# Patient Record
Sex: FEMALE | Race: WHITE | Employment: FULL TIME
[De-identification: names, ages, dates, MRNs, and addresses within clinical notes are randomized per-mention and may not be internally consistent; named-entity substitution may affect disease eponyms.]

---

## 2022-11-16 ENCOUNTER — OFFICE VISIT (OUTPATIENT)
Dept: INTERNAL MEDICINE CLINIC | Facility: CLINIC | Age: 62
End: 2022-11-16

## 2022-11-16 VITALS
SYSTOLIC BLOOD PRESSURE: 186 MMHG | TEMPERATURE: 98.7 F | OXYGEN SATURATION: 97 % | DIASTOLIC BLOOD PRESSURE: 121 MMHG | HEART RATE: 91 BPM | WEIGHT: 192.5 LBS | HEIGHT: 64 IN | BODY MASS INDEX: 32.87 KG/M2

## 2022-11-16 DIAGNOSIS — I10 ESSENTIAL HYPERTENSION: ICD-10-CM

## 2022-11-16 DIAGNOSIS — F41.9 ANXIETY: Primary | ICD-10-CM

## 2022-11-16 LAB
ALBUMIN SERPL-MCNC: 3.9 G/DL (ref 3.2–4.6)
ALBUMIN/GLOB SERPL: 1.1 {RATIO} (ref 0.4–1.6)
ALP SERPL-CCNC: 90 U/L (ref 50–136)
ALT SERPL-CCNC: 21 U/L (ref 12–65)
ANION GAP SERPL CALC-SCNC: 7 MMOL/L (ref 2–11)
AST SERPL-CCNC: 13 U/L (ref 15–37)
BASOPHILS # BLD: 0.1 K/UL (ref 0–0.2)
BASOPHILS NFR BLD: 1 % (ref 0–2)
BILIRUB SERPL-MCNC: 0.5 MG/DL (ref 0.2–1.1)
BUN SERPL-MCNC: 12 MG/DL (ref 8–23)
CALCIUM SERPL-MCNC: 8.7 MG/DL (ref 8.3–10.4)
CHLORIDE SERPL-SCNC: 107 MMOL/L (ref 101–110)
CHOLEST SERPL-MCNC: 209 MG/DL
CO2 SERPL-SCNC: 25 MMOL/L (ref 21–32)
CREAT SERPL-MCNC: 0.7 MG/DL (ref 0.6–1)
CREAT UR-MCNC: 82 MG/DL
DIFFERENTIAL METHOD BLD: NORMAL
EOSINOPHIL # BLD: 0.1 K/UL (ref 0–0.8)
EOSINOPHIL NFR BLD: 2 % (ref 0.5–7.8)
ERYTHROCYTE [DISTWIDTH] IN BLOOD BY AUTOMATED COUNT: 12.8 % (ref 11.9–14.6)
EST. AVERAGE GLUCOSE BLD GHB EST-MCNC: 103 MG/DL
GLOBULIN SER CALC-MCNC: 3.7 G/DL (ref 2.8–4.5)
GLUCOSE SERPL-MCNC: 95 MG/DL (ref 65–100)
HBA1C MFR BLD: 5.2 % (ref 4.8–5.6)
HCT VFR BLD AUTO: 40.3 % (ref 35.8–46.3)
HDLC SERPL-MCNC: 52 MG/DL (ref 40–60)
HDLC SERPL: 4 {RATIO}
HGB BLD-MCNC: 13.2 G/DL (ref 11.7–15.4)
IMM GRANULOCYTES # BLD AUTO: 0 K/UL (ref 0–0.5)
IMM GRANULOCYTES NFR BLD AUTO: 0 % (ref 0–5)
LDLC SERPL CALC-MCNC: 127 MG/DL
LYMPHOCYTES # BLD: 1.3 K/UL (ref 0.5–4.6)
LYMPHOCYTES NFR BLD: 27 % (ref 13–44)
MCH RBC QN AUTO: 28.7 PG (ref 26.1–32.9)
MCHC RBC AUTO-ENTMCNC: 32.8 G/DL (ref 31.4–35)
MCV RBC AUTO: 87.6 FL (ref 82–102)
MICROALBUMIN UR-MCNC: 0.82 MG/DL (ref 0–3)
MICROALBUMIN/CREAT UR-RTO: 10 MG/G (ref 0–30)
MONOCYTES # BLD: 0.5 K/UL (ref 0.1–1.3)
MONOCYTES NFR BLD: 11 % (ref 4–12)
NEUTS SEG # BLD: 2.9 K/UL (ref 1.7–8.2)
NEUTS SEG NFR BLD: 59 % (ref 43–78)
NRBC # BLD: 0 K/UL (ref 0–0.2)
PLATELET # BLD AUTO: 191 K/UL (ref 150–450)
PMV BLD AUTO: 10.5 FL (ref 9.4–12.3)
POTASSIUM SERPL-SCNC: 3.9 MMOL/L (ref 3.5–5.1)
PROT SERPL-MCNC: 7.6 G/DL (ref 6.3–8.2)
RBC # BLD AUTO: 4.6 M/UL (ref 4.05–5.2)
SODIUM SERPL-SCNC: 139 MMOL/L (ref 133–143)
TRIGL SERPL-MCNC: 150 MG/DL (ref 35–150)
VLDLC SERPL CALC-MCNC: 30 MG/DL (ref 6–23)
WBC # BLD AUTO: 4.9 K/UL (ref 4.3–11.1)

## 2022-11-16 PROCEDURE — 99204 OFFICE O/P NEW MOD 45 MIN: CPT | Performed by: STUDENT IN AN ORGANIZED HEALTH CARE EDUCATION/TRAINING PROGRAM

## 2022-11-16 PROCEDURE — 3077F SYST BP >= 140 MM HG: CPT | Performed by: STUDENT IN AN ORGANIZED HEALTH CARE EDUCATION/TRAINING PROGRAM

## 2022-11-16 PROCEDURE — 3080F DIAST BP >= 90 MM HG: CPT | Performed by: STUDENT IN AN ORGANIZED HEALTH CARE EDUCATION/TRAINING PROGRAM

## 2022-11-16 RX ORDER — LISINOPRIL 40 MG/1
40 TABLET ORAL DAILY
COMMUNITY
Start: 2021-09-09

## 2022-11-16 RX ORDER — DICLOFENAC SODIUM 75 MG/1
75 TABLET, DELAYED RELEASE ORAL PRN
COMMUNITY
Start: 2015-08-17

## 2022-11-16 RX ORDER — PSEUDOEPHEDRINE HCL 30 MG
200 TABLET ORAL 2 TIMES DAILY
COMMUNITY
Start: 2015-02-04

## 2022-11-16 RX ORDER — OMEPRAZOLE 20 MG/1
20 CAPSULE, DELAYED RELEASE ORAL NIGHTLY
COMMUNITY
Start: 2021-10-11

## 2022-11-16 RX ORDER — PHENOL 1.4 %
10 AEROSOL, SPRAY (ML) MUCOUS MEMBRANE
COMMUNITY

## 2022-11-16 RX ORDER — MAGNESIUM 30 MG
30 TABLET ORAL
COMMUNITY

## 2022-11-16 RX ORDER — FLUOXETINE HYDROCHLORIDE 40 MG/1
40 CAPSULE ORAL DAILY
COMMUNITY
Start: 2021-09-09 | End: 2022-11-16 | Stop reason: SDUPTHER

## 2022-11-16 RX ORDER — LIDOCAINE 50 MG/G
1 PATCH TOPICAL DAILY
COMMUNITY
Start: 2021-10-22

## 2022-11-16 RX ORDER — LISINOPRIL AND HYDROCHLOROTHIAZIDE 12.5; 1 MG/1; MG/1
1 TABLET ORAL DAILY
Qty: 30 TABLET | Refills: 2 | Status: SHIPPED | OUTPATIENT
Start: 2022-11-16

## 2022-11-16 RX ORDER — ACETAMINOPHEN 325 MG/1
650 TABLET ORAL
COMMUNITY
Start: 2021-10-22

## 2022-11-16 RX ORDER — FLUOXETINE HYDROCHLORIDE 20 MG/1
60 CAPSULE ORAL DAILY
Qty: 90 CAPSULE | Refills: 2 | Status: SHIPPED | OUTPATIENT
Start: 2022-11-16 | End: 2023-02-14

## 2022-11-16 RX ORDER — VITAMIN B COMPLEX
1 TABLET ORAL DAILY
COMMUNITY

## 2022-11-16 RX ORDER — HYDROCORTISONE 25 MG/G
CREAM TOPICAL DAILY PRN
COMMUNITY

## 2022-11-16 SDOH — ECONOMIC STABILITY: FOOD INSECURITY: WITHIN THE PAST 12 MONTHS, YOU WORRIED THAT YOUR FOOD WOULD RUN OUT BEFORE YOU GOT MONEY TO BUY MORE.: SOMETIMES TRUE

## 2022-11-16 SDOH — ECONOMIC STABILITY: FOOD INSECURITY: WITHIN THE PAST 12 MONTHS, THE FOOD YOU BOUGHT JUST DIDN'T LAST AND YOU DIDN'T HAVE MONEY TO GET MORE.: SOMETIMES TRUE

## 2022-11-16 ASSESSMENT — PATIENT HEALTH QUESTIONNAIRE - PHQ9
SUM OF ALL RESPONSES TO PHQ9 QUESTIONS 1 & 2: 4
SUM OF ALL RESPONSES TO PHQ QUESTIONS 1-9: 9
4. FEELING TIRED OR HAVING LITTLE ENERGY: 1
10. IF YOU CHECKED OFF ANY PROBLEMS, HOW DIFFICULT HAVE THESE PROBLEMS MADE IT FOR YOU TO DO YOUR WORK, TAKE CARE OF THINGS AT HOME, OR GET ALONG WITH OTHER PEOPLE: 2
3. TROUBLE FALLING OR STAYING ASLEEP: 2
1. LITTLE INTEREST OR PLEASURE IN DOING THINGS: 2
5. POOR APPETITE OR OVEREATING: 0
8. MOVING OR SPEAKING SO SLOWLY THAT OTHER PEOPLE COULD HAVE NOTICED. OR THE OPPOSITE, BEING SO FIGETY OR RESTLESS THAT YOU HAVE BEEN MOVING AROUND A LOT MORE THAN USUAL: 1
7. TROUBLE CONCENTRATING ON THINGS, SUCH AS READING THE NEWSPAPER OR WATCHING TELEVISION: 1
SUM OF ALL RESPONSES TO PHQ QUESTIONS 1-9: 9
2. FEELING DOWN, DEPRESSED OR HOPELESS: 2
SUM OF ALL RESPONSES TO PHQ QUESTIONS 1-9: 9
9. THOUGHTS THAT YOU WOULD BE BETTER OFF DEAD, OR OF HURTING YOURSELF: 0
SUM OF ALL RESPONSES TO PHQ QUESTIONS 1-9: 9
6. FEELING BAD ABOUT YOURSELF - OR THAT YOU ARE A FAILURE OR HAVE LET YOURSELF OR YOUR FAMILY DOWN: 0

## 2022-11-16 ASSESSMENT — ENCOUNTER SYMPTOMS
VOMITING: 0
ABDOMINAL PAIN: 0
NAUSEA: 0
SHORTNESS OF BREATH: 0

## 2022-11-16 ASSESSMENT — SOCIAL DETERMINANTS OF HEALTH (SDOH): HOW HARD IS IT FOR YOU TO PAY FOR THE VERY BASICS LIKE FOOD, HOUSING, MEDICAL CARE, AND HEATING?: SOMEWHAT HARD
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
## 2022-12-07 ENCOUNTER — OFFICE VISIT (OUTPATIENT)
Dept: INTERNAL MEDICINE CLINIC | Facility: CLINIC | Age: 62
End: 2022-12-07
Payer: COMMERCIAL

## 2022-12-07 VITALS
HEIGHT: 64 IN | OXYGEN SATURATION: 98 % | WEIGHT: 188 LBS | HEART RATE: 106 BPM | DIASTOLIC BLOOD PRESSURE: 78 MMHG | SYSTOLIC BLOOD PRESSURE: 118 MMHG | BODY MASS INDEX: 32.1 KG/M2

## 2022-12-07 DIAGNOSIS — K21.9 GASTROESOPHAGEAL REFLUX DISEASE, UNSPECIFIED WHETHER ESOPHAGITIS PRESENT: ICD-10-CM

## 2022-12-07 DIAGNOSIS — B37.9 YEAST INFECTION: ICD-10-CM

## 2022-12-07 DIAGNOSIS — E78.00 PURE HYPERCHOLESTEROLEMIA: ICD-10-CM

## 2022-12-07 DIAGNOSIS — I10 ESSENTIAL HYPERTENSION: Primary | ICD-10-CM

## 2022-12-07 DIAGNOSIS — F41.9 ANXIETY: ICD-10-CM

## 2022-12-07 DIAGNOSIS — Z12.31 ENCOUNTER FOR SCREENING MAMMOGRAM FOR MALIGNANT NEOPLASM OF BREAST: ICD-10-CM

## 2022-12-07 PROBLEM — S82.841A CLOSED BIMALLEOLAR FRACTURE OF RIGHT ANKLE: Status: ACTIVE | Noted: 2019-01-15

## 2022-12-07 PROBLEM — S32.000A COMPRESSION FRACTURE OF LUMBAR VERTEBRA (HCC): Status: ACTIVE | Noted: 2021-12-20

## 2022-12-07 PROCEDURE — 3078F DIAST BP <80 MM HG: CPT | Performed by: STUDENT IN AN ORGANIZED HEALTH CARE EDUCATION/TRAINING PROGRAM

## 2022-12-07 PROCEDURE — 99214 OFFICE O/P EST MOD 30 MIN: CPT | Performed by: STUDENT IN AN ORGANIZED HEALTH CARE EDUCATION/TRAINING PROGRAM

## 2022-12-07 PROCEDURE — 3074F SYST BP LT 130 MM HG: CPT | Performed by: STUDENT IN AN ORGANIZED HEALTH CARE EDUCATION/TRAINING PROGRAM

## 2022-12-07 RX ORDER — LISINOPRIL AND HYDROCHLOROTHIAZIDE 12.5; 1 MG/1; MG/1
1 TABLET ORAL DAILY
Qty: 90 TABLET | Refills: 1 | Status: SHIPPED | OUTPATIENT
Start: 2022-12-07

## 2022-12-07 RX ORDER — FLUOXETINE HYDROCHLORIDE 20 MG/1
60 CAPSULE ORAL DAILY
Qty: 270 CAPSULE | Refills: 1 | Status: SHIPPED | OUTPATIENT
Start: 2022-12-07 | End: 2023-03-07

## 2022-12-07 RX ORDER — OMEPRAZOLE 20 MG/1
20 CAPSULE, DELAYED RELEASE ORAL NIGHTLY
Qty: 90 CAPSULE | Refills: 1 | Status: SHIPPED | OUTPATIENT
Start: 2022-12-07

## 2022-12-07 ASSESSMENT — PATIENT HEALTH QUESTIONNAIRE - PHQ9
SUM OF ALL RESPONSES TO PHQ QUESTIONS 1-9: 0
2. FEELING DOWN, DEPRESSED OR HOPELESS: 0
1. LITTLE INTEREST OR PLEASURE IN DOING THINGS: 0
SUM OF ALL RESPONSES TO PHQ9 QUESTIONS 1 & 2: 0

## 2022-12-07 ASSESSMENT — ENCOUNTER SYMPTOMS
NAUSEA: 0
ABDOMINAL PAIN: 0
VOMITING: 0
SHORTNESS OF BREATH: 0

## 2022-12-07 NOTE — PROGRESS NOTES
FOLLOW UP VISIT    Subjective:    Ingrid Grant (: 1960) is a 65 y.o., female,   Chief Complaint   Patient presents with    Hypertension     3 week f/u       HPI:    Uses stool softener at night, having explosive diarrhea, some fecal incontinence. IBS: Takes colace at night, 200 mg at night for years. Has both constipation and diarrhea. Imodium doesn't really work well. Anxiety has been much better. Recent promotion, her SO is coming next week. Mother had breast cancer age 76 and again age [de-identified]. Father  age 47 lung cancer, smoked    HTN: BP has been slowly decreasing, 120-130s up to 150s, bottom number 70-90s. Healthcare maintenance: Last colonoscopy 2-3 years ago, due in about 2 years. Last mammogram 1.5 years ago. Last pap about 2 years ago. Had flu shot, COVID x2 and a booster (updated one), due for shingles    The following portions of the patient's history were reviewed and updated as appropriate:      Patient Active Problem List   Diagnosis    Anxiety    Essential hypertension     Past Surgical History:   Procedure Laterality Date    ANKLE FRACTURE SURGERY Right     twice     History reviewed. No pertinent family history.   Social History     Socioeconomic History    Marital status: Single     Spouse name: Not on file    Number of children: Not on file    Years of education: Not on file    Highest education level: Not on file   Occupational History    Not on file   Tobacco Use    Smoking status: Never    Smokeless tobacco: Never   Vaping Use    Vaping Use: Never used   Substance and Sexual Activity    Alcohol use: Not Currently    Drug use: Never    Sexual activity: Not on file   Other Topics Concern    Not on file   Social History Narrative    Not on file     Social Determinants of Health     Financial Resource Strain: Medium Risk    Difficulty of Paying Living Expenses: Somewhat hard   Food Insecurity: Food Insecurity Present    Worried About [address] in the Last Year: Sometimes true    Ran Out of Food in the Last Year: Sometimes true   Transportation Needs: Not on file   Physical Activity: Not on file   Stress: Not on file   Social Connections: Not on file   Intimate Partner Violence: Not on file   Housing Stability: Not on file     Current Outpatient Medications   Medication Sig Dispense Refill    diphenhydrAMINE-APAP, sleep, (TYLENOL PM EXTRA STRENGTH PO) Take 2 tablets by mouth nightly      lisinopril-hydroCHLOROthiazide (PRINZIDE;ZESTORETIC) 10-12.5 MG per tablet Take 1 tablet by mouth daily 90 tablet 1    FLUoxetine (PROZAC) 20 MG capsule Take 3 capsules by mouth daily 270 capsule 1    omeprazole (PRILOSEC) 20 MG delayed release capsule Take 1 capsule by mouth nightly 90 capsule 1    miconazole nitrate (LOTRIMIN AF) 2 % AERP Apply to affected area two times a day for 28 days 85 g 0    Vitamin D (CHOLECALCIFEROL) 25 MCG (1000 UT) TABS tablet Take 1 tablet by mouth daily      diclofenac (VOLTAREN) 75 MG EC tablet Take 75 mg by mouth as needed      hydrocortisone (ANUSOL-HC) 2.5 % CREA rectal cream Place rectally daily as needed      magnesium 30 MG tablet Take 30 mg by mouth daily      Melatonin 10 MG TABS Take 10 mg by mouth nightly      docusate (COLACE, DULCOLAX) 100 MG CAPS Take 200 mg by mouth 2 times daily       No current facility-administered medications for this visit. Allergies as of 12/07/2022 - Fully Reviewed 12/07/2022   Allergen Reaction Noted    Codeine Nausea And Vomiting 02/02/2015    Hydromorphone Nausea And Vomiting 10/17/2021    Cefuroxime axetil Nausea Only 02/26/2013    Sulfa antibiotics Other (See Comments) 02/02/2015    Oxycodone-acetaminophen Nausea And Vomiting 10/14/2015    Penicillins Nausea And Vomiting 02/26/2013    Tomato Nausea And Vomiting 08/31/2017       Review of Systems   Constitutional:  Negative for chills and fever. HENT:  Negative for congestion. Eyes:  Negative for visual disturbance.    Respiratory:  Negative for shortness of breath. Cardiovascular:  Negative for chest pain. Gastrointestinal:  Negative for abdominal pain, nausea and vomiting. Musculoskeletal:  Negative for arthralgias. Neurological:  Negative for syncope and headaches. Objective:    Vitals:    12/07/22 1434   BP: 118/78   Pulse: (!) 106   SpO2: 98%   Weight: 188 lb (85.3 kg)   Height: 5' 4\" (1.626 m)        Physical Exam  Constitutional:       General: She is not in acute distress. HENT:      Head: Normocephalic and atraumatic. Right Ear: Tympanic membrane, ear canal and external ear normal.      Left Ear: Tympanic membrane, ear canal and external ear normal.      Nose: Nose normal.      Mouth/Throat:      Mouth: Mucous membranes are moist.      Pharynx: Oropharynx is clear. No oropharyngeal exudate. Eyes:      Extraocular Movements: Extraocular movements intact. Conjunctiva/sclera: Conjunctivae normal.      Pupils: Pupils are equal, round, and reactive to light. Neck:      Vascular: No carotid bruit. Cardiovascular:      Rate and Rhythm: Normal rate and regular rhythm. Pulses: Normal pulses. Heart sounds: Normal heart sounds. Pulmonary:      Effort: Pulmonary effort is normal.      Breath sounds: Normal breath sounds. No wheezing, rhonchi or rales. Abdominal:      General: Bowel sounds are normal. There is no distension. Palpations: There is no mass. Tenderness: There is no abdominal tenderness. There is no guarding. Musculoskeletal:         General: No swelling or deformity. Cervical back: Normal range of motion. Lymphadenopathy:      Cervical: No cervical adenopathy. Skin:     General: Skin is warm and dry. Neurological:      General: No focal deficit present. Mental Status: She is alert. Mental status is at baseline.    Psychiatric:         Mood and Affect: Mood normal.       Lab Results   Component Value Date    WBC 4.9 11/16/2022    HGB 13.2 11/16/2022    HCT 40.3 11/16/2022    MCV 87.6 11/16/2022     11/16/2022      Lab Results   Component Value Date/Time     11/16/2022 03:42 PM    K 3.9 11/16/2022 03:42 PM     11/16/2022 03:42 PM    CO2 25 11/16/2022 03:42 PM    BUN 12 11/16/2022 03:42 PM    CREATININE 0.70 11/16/2022 03:42 PM    GLUCOSE 95 11/16/2022 03:42 PM    CALCIUM 8.7 11/16/2022 03:42 PM       Lab Results   Component Value Date     11/16/2022    K 3.9 11/16/2022     11/16/2022    CO2 25 11/16/2022    BUN 12 11/16/2022    CREATININE 0.70 11/16/2022    GLUCOSE 95 11/16/2022    CALCIUM 8.7 11/16/2022    PROT 7.6 11/16/2022    LABALBU 3.9 11/16/2022    BILITOT 0.5 11/16/2022    ALKPHOS 90 11/16/2022    AST 13 (L) 11/16/2022    ALT 21 11/16/2022    LABGLOM >60 11/16/2022    GLOB 3.7 11/16/2022     No results found for: TSHFT4, TSH, TSHREFLEX, NSK1ACW   Lab Results   Component Value Date    CHOL 209 (H) 11/16/2022     Lab Results   Component Value Date    TRIG 150 11/16/2022     Lab Results   Component Value Date    HDL 52 11/16/2022     Lab Results   Component Value Date    LDLCALC 127 (H) 11/16/2022     Lab Results   Component Value Date    LABVLDL 30 (H) 11/16/2022     Lab Results   Component Value Date    CHOLHDLRATIO 4.0 11/16/2022      Hemoglobin A1C   Date Value Ref Range Status   11/16/2022 5.2 4.8 - 5.6 % Final        Assessent & Plan    1. Essential hypertension  -     lisinopril-hydroCHLOROthiazide (PRINZIDE;ZESTORETIC) 10-12.5 MG per tablet; Take 1 tablet by mouth daily, Disp-90 tablet, R-1Normal  -     TSH with Reflex; Future  -     Comprehensive Metabolic Panel; Future  -     CBC with Auto Differential; Future  2. Anxiety  -     FLUoxetine (PROZAC) 20 MG capsule; Take 3 capsules by mouth daily, Disp-270 capsule, R-1Normal  3. Encounter for screening mammogram for malignant neoplasm of breast  -     GEORGE DIGITAL SCREEN W OR WO CAD BILATERAL; Future  4. Yeast infection  -     miconazole nitrate (LOTRIMIN AF) 2 % AERP;  Apply to affected area two times a day for 28 days, Disp-85 g, R-0, Normal  5. Pure hypercholesterolemia  -     Lipid Panel; Future  6. Gastroesophageal reflux disease, unspecified whether esophagitis present  -     omeprazole (PRILOSEC) 20 MG delayed release capsule; Take 1 capsule by mouth nightly, Disp-90 capsule, R-1Normal     BP and anxiety much better  Labs reviewed, LDL of 127, discussed diet with healthy fats. Get mammogram  Intertrigo under breasts, will try miconazole powder    The 10-year ASCVD risk score (Suzi HARTLEY, et al., 2019) is: 4.5%    Values used to calculate the score:      Age: 65 years      Sex: Female      Is Non- : No      Diabetic: No      Tobacco smoker: No      Systolic Blood Pressure: 142 mmHg      Is BP treated: Yes      HDL Cholesterol: 52 MG/DL      Total Cholesterol: 209 MG/DL     Discussed BMI and healthy weight and diet, exercise, and medication compliance. The patient was counseled regarding screening procedures and recommended schedules for Pap smears, mammography, colon cancer screening, BMD, and regular immunizations. The patient and/or patient representative voiced understanding and agreement with the current diagnoses, recommendations, and possible side effects. Return in about 4 months (around 4/7/2023) for routine follow up, with fasting labs 1 week prior.      Camila Laureano MD

## 2023-04-12 PROBLEM — T85.848A PAIN DUE TO INTERNAL PROSTHETIC DEVICE: Status: ACTIVE | Noted: 2023-02-14

## 2023-04-12 PROBLEM — S92.309A METATARSAL BONE FRACTURE: Status: ACTIVE | Noted: 2021-11-12

## 2023-04-12 PROBLEM — M20.42 HAMMER TOE OF LEFT FOOT: Status: ACTIVE | Noted: 2017-09-11

## 2023-04-12 PROBLEM — M06.9 RHEUMATOID ARTHRITIS (HCC): Status: ACTIVE | Noted: 2019-11-14

## 2023-04-12 PROBLEM — E66.9 OBESITY WITH BODY MASS INDEX 30 OR GREATER: Status: ACTIVE | Noted: 2021-10-27

## 2023-04-12 PROBLEM — S92.321A: Status: ACTIVE | Noted: 2021-10-27

## 2023-04-12 PROBLEM — F41.9 ANXIETY: Status: ACTIVE | Noted: 2019-11-14

## 2023-04-17 ENCOUNTER — TELEPHONE (OUTPATIENT)
Dept: INTERNAL MEDICINE CLINIC | Facility: CLINIC | Age: 63
End: 2023-04-17

## 2023-04-17 NOTE — TELEPHONE ENCOUNTER
----- Message from Harding Asa sent at 4/17/2023  3:37 PM EDT -----  Subject: Message to Provider    QUESTIONS  Information for Provider? Pt states she was given a referral for   Rheumatology at the [address] [place] [address], she called and they said   they have no referral for her so she could not schedule.   ---------------------------------------------------------------------------  --------------  Chiqui CHRISTENSEN  [phone]; OK to leave message on voicemail  ---------------------------------------------------------------------------  --------------  SCRIPT ANSWERS  Relationship to Patient?  Self

## 2023-04-17 NOTE — TELEPHONE ENCOUNTER
----- Message from Wesly Herrmann sent at 4/17/2023  3:37 PM EDT -----  Subject: Message to Provider    QUESTIONS  Information for Provider? Pt states she was given a referral for   Rheumatology at the [address] [place] [address], she called and they said   they have no referral for her so she could not schedule.   ---------------------------------------------------------------------------  --------------  Ankush CHRISTENSEN  [phone]; OK to leave message on voicemail  ---------------------------------------------------------------------------  --------------  SCRIPT ANSWERS  Relationship to Patient?  Self

## 2023-04-17 NOTE — TELEPHONE ENCOUNTER
Pt advised that the referral was placed today and Santy Tena our referral coordinator will be in touch with them and then they will call her to schedule an appointment.

## 2023-04-19 ENCOUNTER — HOSPITAL ENCOUNTER (OUTPATIENT)
Dept: MAMMOGRAPHY | Age: 63
Discharge: HOME | End: 2023-04-22

## 2023-04-19 DIAGNOSIS — Z12.31 ENCOUNTER FOR SCREENING MAMMOGRAM FOR MALIGNANT NEOPLASM OF BREAST: ICD-10-CM

## 2023-05-30 ENCOUNTER — HOSPITAL ENCOUNTER (OUTPATIENT)
Dept: MAMMOGRAPHY | Age: 63
Discharge: HOME | End: 2023-06-02
Payer: COMMERCIAL

## 2023-05-30 DIAGNOSIS — R92.8 ABNORMAL SCREENING MAMMOGRAM: ICD-10-CM

## 2023-05-30 PROCEDURE — 77065 DX MAMMO INCL CAD UNI: CPT

## 2023-06-06 DIAGNOSIS — R92.8 ABNORMAL MAMMOGRAM: Primary | ICD-10-CM

## 2023-07-26 DIAGNOSIS — F41.9 ANXIETY: ICD-10-CM

## 2023-07-26 DIAGNOSIS — K21.9 GASTROESOPHAGEAL REFLUX DISEASE, UNSPECIFIED WHETHER ESOPHAGITIS PRESENT: ICD-10-CM

## 2023-07-26 DIAGNOSIS — I10 ESSENTIAL HYPERTENSION: ICD-10-CM

## 2023-07-26 NOTE — TELEPHONE ENCOUNTER
----- Message from Onfany sent at 7/25/2023 11:05 AM EDT -----  Subject: Refill Request    QUESTIONS  Name of Medication? FLUoxetine (PROZAC) 20 MG capsule  Patient-reported dosage and instructions? 1 x daily  How many days do you have left? 10  Preferred Pharmacy? [facility]   Pharmacy phone number (if available)? [phone]  ---------------------------------------------------------------------------  --------------,  Name of Medication? omeprazole (PRILOSEC) 20 MG delayed release capsule  Patient-reported dosage and instructions? 1x nite  How many days do you have left? 20  Preferred Pharmacy? [facility]   Pharmacy phone number (if available)? [phone]  ---------------------------------------------------------------------------  --------------,  Name of Medication? lisinopril-hydroCHLOROthiazide (PRINZIDE;ZESTORETIC)   10-12.5 MG per tablet  Patient-reported dosage and instructions? 1 time   How many days do you have left? 20  Preferred Pharmacy? [facility]   Pharmacy phone number (if available)? [phone]  ---------------------------------------------------------------------------  --------------  Delbert CHRISTENSEN  What is the best way for the office to contact you? OK to leave message on   voicemail  Preferred Call Back Phone Number? [phone]  ---------------------------------------------------------------------------  --------------  SCRIPT ANSWERS  Relationship to Patient?  Self

## 2023-07-27 RX ORDER — OMEPRAZOLE 20 MG/1
20 CAPSULE, DELAYED RELEASE ORAL NIGHTLY
Qty: 90 CAPSULE | Refills: 0 | Status: SHIPPED | OUTPATIENT
Start: 2023-07-27

## 2023-07-27 RX ORDER — FLUOXETINE HYDROCHLORIDE 20 MG/1
60 CAPSULE ORAL DAILY
Qty: 270 CAPSULE | Refills: 0 | Status: SHIPPED | OUTPATIENT
Start: 2023-07-27 | End: 2023-10-25

## 2023-07-27 RX ORDER — LISINOPRIL AND HYDROCHLOROTHIAZIDE 12.5; 1 MG/1; MG/1
1 TABLET ORAL DAILY
Qty: 90 TABLET | Refills: 0 | Status: SHIPPED | OUTPATIENT
Start: 2023-07-27

## 2023-08-24 ENCOUNTER — OFFICE VISIT (OUTPATIENT)
Dept: INTERNAL MEDICINE CLINIC | Facility: CLINIC | Age: 63
End: 2023-08-24
Payer: COMMERCIAL

## 2023-08-24 VITALS
HEIGHT: 64 IN | DIASTOLIC BLOOD PRESSURE: 72 MMHG | HEART RATE: 78 BPM | BODY MASS INDEX: 33.46 KG/M2 | SYSTOLIC BLOOD PRESSURE: 120 MMHG | WEIGHT: 196 LBS | OXYGEN SATURATION: 95 %

## 2023-08-24 DIAGNOSIS — R91.1 INCIDENTAL LUNG NODULE: ICD-10-CM

## 2023-08-24 DIAGNOSIS — I10 ESSENTIAL HYPERTENSION, BENIGN: Primary | ICD-10-CM

## 2023-08-24 DIAGNOSIS — H60.501 ACUTE OTITIS EXTERNA OF RIGHT EAR, UNSPECIFIED TYPE: ICD-10-CM

## 2023-08-24 DIAGNOSIS — E78.5 HYPERLIPIDEMIA, UNSPECIFIED HYPERLIPIDEMIA TYPE: ICD-10-CM

## 2023-08-24 DIAGNOSIS — L29.9 EAR ITCHING: ICD-10-CM

## 2023-08-24 DIAGNOSIS — I10 ESSENTIAL HYPERTENSION: ICD-10-CM

## 2023-08-24 PROBLEM — S32.000A COMPRESSION FRACTURE OF LUMBAR VERTEBRA (HCC): Status: RESOLVED | Noted: 2021-12-20 | Resolved: 2023-08-24

## 2023-08-24 PROBLEM — S92.321A: Status: RESOLVED | Noted: 2021-10-27 | Resolved: 2023-08-24

## 2023-08-24 PROBLEM — S92.311A: Status: RESOLVED | Noted: 2021-10-27 | Resolved: 2023-08-24

## 2023-08-24 PROBLEM — S92.309A METATARSAL BONE FRACTURE: Status: RESOLVED | Noted: 2021-11-12 | Resolved: 2023-08-24

## 2023-08-24 PROBLEM — S92.311A: Status: ACTIVE | Noted: 2021-10-27

## 2023-08-24 PROBLEM — S82.841A CLOSED BIMALLEOLAR FRACTURE OF RIGHT ANKLE: Status: RESOLVED | Noted: 2019-01-15 | Resolved: 2023-08-24

## 2023-08-24 PROCEDURE — 3078F DIAST BP <80 MM HG: CPT | Performed by: STUDENT IN AN ORGANIZED HEALTH CARE EDUCATION/TRAINING PROGRAM

## 2023-08-24 PROCEDURE — 3074F SYST BP LT 130 MM HG: CPT | Performed by: STUDENT IN AN ORGANIZED HEALTH CARE EDUCATION/TRAINING PROGRAM

## 2023-08-24 PROCEDURE — 99214 OFFICE O/P EST MOD 30 MIN: CPT | Performed by: STUDENT IN AN ORGANIZED HEALTH CARE EDUCATION/TRAINING PROGRAM

## 2023-08-24 RX ORDER — CIPROFLOXACIN AND DEXAMETHASONE 3; 1 MG/ML; MG/ML
4 SUSPENSION/ DROPS AURICULAR (OTIC) 2 TIMES DAILY
Qty: 1 EACH | Refills: 0 | Status: SHIPPED | OUTPATIENT
Start: 2023-08-24 | End: 2023-08-31

## 2023-08-24 ASSESSMENT — PATIENT HEALTH QUESTIONNAIRE - PHQ9
8. MOVING OR SPEAKING SO SLOWLY THAT OTHER PEOPLE COULD HAVE NOTICED. OR THE OPPOSITE, BEING SO FIGETY OR RESTLESS THAT YOU HAVE BEEN MOVING AROUND A LOT MORE THAN USUAL: 0
3. TROUBLE FALLING OR STAYING ASLEEP: 0
SUM OF ALL RESPONSES TO PHQ QUESTIONS 1-9: 0
1. LITTLE INTEREST OR PLEASURE IN DOING THINGS: 0
10. IF YOU CHECKED OFF ANY PROBLEMS, HOW DIFFICULT HAVE THESE PROBLEMS MADE IT FOR YOU TO DO YOUR WORK, TAKE CARE OF THINGS AT HOME, OR GET ALONG WITH OTHER PEOPLE: 0
9. THOUGHTS THAT YOU WOULD BE BETTER OFF DEAD, OR OF HURTING YOURSELF: 0
SUM OF ALL RESPONSES TO PHQ QUESTIONS 1-9: 0
7. TROUBLE CONCENTRATING ON THINGS, SUCH AS READING THE NEWSPAPER OR WATCHING TELEVISION: 0
2. FEELING DOWN, DEPRESSED OR HOPELESS: 0
6. FEELING BAD ABOUT YOURSELF - OR THAT YOU ARE A FAILURE OR HAVE LET YOURSELF OR YOUR FAMILY DOWN: 0
SUM OF ALL RESPONSES TO PHQ QUESTIONS 1-9: 0
4. FEELING TIRED OR HAVING LITTLE ENERGY: 0
SUM OF ALL RESPONSES TO PHQ9 QUESTIONS 1 & 2: 0
5. POOR APPETITE OR OVEREATING: 0
SUM OF ALL RESPONSES TO PHQ QUESTIONS 1-9: 0

## 2023-08-24 NOTE — PROGRESS NOTES
FOLLOW UP VISIT    Subjective:    Alysia Villa (: 1960) is a 65 y.o., female,   Chief Complaint   Patient presents with    Ear Fullness    Hypertension       HPI:    Recently engaged. 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 HTN: BP controlled, today is 120/72, usually 130/70-80s. Ear fullness: of the R ear, itching, ear ache at night. Uses qtips, knows she shouldn't. IBS: usually a few BM per day. Fecal impaction 3/2023, seen in ED, better now. Seropositive RA: has appointment in 2 months with rheum    CT abdomen, pelvis from 3/2023 noted incidental 5 mm lung nodule left lower lobe. Her father  from lung cancer age 47. GERD: on omeprazole 20 mg daily, controlled    Chronic pain: meloxicam infrequently. Depression, anxiety: prozac 60 mg daily controlled, trying not to get overwhelmed at work, very busy. No SI/HI. Declines counseling at this time. Healthcare maintenance: colo due , Last pap about 2 years ago. No history abnormal Pap.   Had flu shot, COVID x2 and a booster (updated one), due for shingles    The following portions of the patient's history were reviewed and updated as appropriate:      Patient Active Problem List   Diagnosis    Anxiety    Essential hypertension, benign    External hemorrhoids without complication    GERD (gastroesophageal reflux disease)    Chronic rectal fissure    Plantar fascial fibromatosis    Rheumatoid arthritis (HCC)    Depressive disorder    Hammer toe of left foot    Pain associated with internal prosthetic device    Obesity with body mass index 30 or greater    Hyperlipidemia     Past Surgical History:   Procedure Laterality Date    ANKLE FRACTURE SURGERY Right     twice    FOOT SURGERY       Family History   Problem Relation Age of Onset    Breast Cancer Mother 76    Lung Cancer Father 47         age 47     Social History     Socioeconomic History    Marital status: Single     Spouse name: Not on file    Number of children: Not on

## 2023-09-11 ENCOUNTER — HOSPITAL ENCOUNTER (OUTPATIENT)
Dept: CT IMAGING | Age: 63
Discharge: HOME | End: 2023-09-14
Attending: STUDENT IN AN ORGANIZED HEALTH CARE EDUCATION/TRAINING PROGRAM

## 2023-09-11 DIAGNOSIS — R91.1 INCIDENTAL LUNG NODULE: ICD-10-CM

## 2023-10-31 DIAGNOSIS — K21.9 GASTROESOPHAGEAL REFLUX DISEASE, UNSPECIFIED WHETHER ESOPHAGITIS PRESENT: ICD-10-CM

## 2023-11-01 RX ORDER — OMEPRAZOLE 20 MG/1
20 CAPSULE, DELAYED RELEASE ORAL NIGHTLY
Qty: 90 CAPSULE | Refills: 1 | Status: SHIPPED | OUTPATIENT
Start: 2023-11-01

## 2023-11-03 ENCOUNTER — TELEPHONE (OUTPATIENT)
Dept: INTERNAL MEDICINE CLINIC | Facility: CLINIC | Age: 63
End: 2023-11-03

## 2023-11-03 DIAGNOSIS — N63.15 MASS OVERLAPPING MULTIPLE QUADRANTS OF RIGHT BREAST: Primary | ICD-10-CM

## 2023-11-03 NOTE — TELEPHONE ENCOUNTER
I spoke with Radiology scheduling and advised them that the orders are already in the system. I was advised that they can see them and someone will call pt to schedule. She advised that if pt does not get scheduled by Tuesday to have her call them. Left message asking pt to please call back, so that I can let her know and give her the number to call if they do not call her.

## 2023-11-03 NOTE — TELEPHONE ENCOUNTER
Miss Lady Pace from [facility] requested an US order. Please fax order to [phone].

## 2023-11-08 ENCOUNTER — TELEMEDICINE (OUTPATIENT)
Dept: INTERNAL MEDICINE CLINIC | Facility: CLINIC | Age: 63
End: 2023-11-08
Payer: COMMERCIAL

## 2023-11-08 DIAGNOSIS — J01.00 ACUTE MAXILLARY SINUSITIS, RECURRENCE NOT SPECIFIED: Primary | ICD-10-CM

## 2023-11-08 PROCEDURE — 99213 OFFICE O/P EST LOW 20 MIN: CPT | Performed by: STUDENT IN AN ORGANIZED HEALTH CARE EDUCATION/TRAINING PROGRAM

## 2023-11-08 RX ORDER — HYDROXYCHLOROQUINE SULFATE 200 MG/1
200 TABLET, FILM COATED ORAL DAILY
COMMUNITY
Start: 2023-10-19

## 2023-11-08 RX ORDER — AMOXICILLIN AND CLAVULANATE POTASSIUM 875; 125 MG/1; MG/1
1 TABLET, FILM COATED ORAL 2 TIMES DAILY
Qty: 14 TABLET | Refills: 0 | Status: SHIPPED | OUTPATIENT
Start: 2023-11-08 | End: 2023-11-15
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
## 2023-11-20 ENCOUNTER — HOSPITAL ENCOUNTER (OUTPATIENT)
Dept: MAMMOGRAPHY | Age: 63
Discharge: HOME | End: 2023-11-23
Attending: STUDENT IN AN ORGANIZED HEALTH CARE EDUCATION/TRAINING PROGRAM
Payer: COMMERCIAL

## 2023-11-20 DIAGNOSIS — N63.15 MASS OVERLAPPING MULTIPLE QUADRANTS OF RIGHT BREAST: ICD-10-CM

## 2023-11-20 PROCEDURE — G0279 TOMOSYNTHESIS, MAMMO: HCPCS

## 2023-12-14 DIAGNOSIS — F41.9 ANXIETY: ICD-10-CM

## 2023-12-14 DIAGNOSIS — I10 ESSENTIAL HYPERTENSION: ICD-10-CM

## 2023-12-14 DIAGNOSIS — K21.9 GASTROESOPHAGEAL REFLUX DISEASE, UNSPECIFIED WHETHER ESOPHAGITIS PRESENT: ICD-10-CM

## 2023-12-14 RX ORDER — LISINOPRIL AND HYDROCHLOROTHIAZIDE 12.5; 1 MG/1; MG/1
1 TABLET ORAL DAILY
Qty: 90 TABLET | Refills: 3 | Status: SHIPPED | OUTPATIENT
Start: 2023-12-14

## 2023-12-14 RX ORDER — OMEPRAZOLE 20 MG/1
20 CAPSULE, DELAYED RELEASE ORAL NIGHTLY
Qty: 90 CAPSULE | Refills: 3 | Status: SHIPPED | OUTPATIENT
Start: 2023-12-14

## 2023-12-14 RX ORDER — FLUOXETINE HYDROCHLORIDE 20 MG/1
60 CAPSULE ORAL DAILY
Qty: 270 CAPSULE | Refills: 3 | Status: SHIPPED | OUTPATIENT
Start: 2023-12-14 | End: 2024-12-08

## 2024-02-08 ENCOUNTER — OFFICE VISIT (OUTPATIENT)
Dept: INTERNAL MEDICINE CLINIC | Facility: CLINIC | Age: 64
End: 2024-02-08
Payer: COMMERCIAL

## 2024-02-08 VITALS
DIASTOLIC BLOOD PRESSURE: 72 MMHG | HEIGHT: 64 IN | OXYGEN SATURATION: 97 % | WEIGHT: 202 LBS | SYSTOLIC BLOOD PRESSURE: 140 MMHG | BODY MASS INDEX: 34.49 KG/M2 | HEART RATE: 87 BPM

## 2024-02-08 DIAGNOSIS — K58.2 IRRITABLE BOWEL SYNDROME WITH BOTH CONSTIPATION AND DIARRHEA: Primary | ICD-10-CM

## 2024-02-08 DIAGNOSIS — I10 ESSENTIAL HYPERTENSION, BENIGN: ICD-10-CM

## 2024-02-08 DIAGNOSIS — K21.9 GASTROESOPHAGEAL REFLUX DISEASE, UNSPECIFIED WHETHER ESOPHAGITIS PRESENT: ICD-10-CM

## 2024-02-08 DIAGNOSIS — R09.81 SINUS CONGESTION: ICD-10-CM

## 2024-02-08 DIAGNOSIS — M05.9 RHEUMATOID ARTHRITIS WITH POSITIVE RHEUMATOID FACTOR, INVOLVING UNSPECIFIED SITE (HCC): ICD-10-CM

## 2024-02-08 PROBLEM — G25.0 BENIGN HEAD TREMOR: Status: RESOLVED | Noted: 2024-02-08 | Resolved: 2024-02-08

## 2024-02-08 PROBLEM — G25.0 BENIGN HEAD TREMOR: Status: ACTIVE | Noted: 2024-02-08

## 2024-02-08 PROCEDURE — 3078F DIAST BP <80 MM HG: CPT | Performed by: STUDENT IN AN ORGANIZED HEALTH CARE EDUCATION/TRAINING PROGRAM

## 2024-02-08 PROCEDURE — 3077F SYST BP >= 140 MM HG: CPT | Performed by: STUDENT IN AN ORGANIZED HEALTH CARE EDUCATION/TRAINING PROGRAM

## 2024-02-08 PROCEDURE — 99214 OFFICE O/P EST MOD 30 MIN: CPT | Performed by: STUDENT IN AN ORGANIZED HEALTH CARE EDUCATION/TRAINING PROGRAM

## 2024-02-08 RX ORDER — VIT C/B6/B5/MAGNESIUM/HERB 173 50-5-6-5MG
500 CAPSULE ORAL DAILY
COMMUNITY

## 2024-02-08 RX ORDER — DICYCLOMINE HYDROCHLORIDE 10 MG/1
10 CAPSULE ORAL 2 TIMES DAILY PRN
Qty: 60 CAPSULE | Refills: 0 | Status: SHIPPED | OUTPATIENT
Start: 2024-02-08

## 2024-02-08 ASSESSMENT — PATIENT HEALTH QUESTIONNAIRE - PHQ9
3. TROUBLE FALLING OR STAYING ASLEEP: 0
SUM OF ALL RESPONSES TO PHQ QUESTIONS 1-9: 1
1. LITTLE INTEREST OR PLEASURE IN DOING THINGS: 0
4. FEELING TIRED OR HAVING LITTLE ENERGY: 1
5. POOR APPETITE OR OVEREATING: 0
SUM OF ALL RESPONSES TO PHQ QUESTIONS 1-9: 1
10. IF YOU CHECKED OFF ANY PROBLEMS, HOW DIFFICULT HAVE THESE PROBLEMS MADE IT FOR YOU TO DO YOUR WORK, TAKE CARE OF THINGS AT HOME, OR GET ALONG WITH OTHER PEOPLE: 0
9. THOUGHTS THAT YOU WOULD BE BETTER OFF DEAD, OR OF HURTING YOURSELF: 0
7. TROUBLE CONCENTRATING ON THINGS, SUCH AS READING THE NEWSPAPER OR WATCHING TELEVISION: 0
8. MOVING OR SPEAKING SO SLOWLY THAT OTHER PEOPLE COULD HAVE NOTICED. OR THE OPPOSITE, BEING SO FIGETY OR RESTLESS THAT YOU HAVE BEEN MOVING AROUND A LOT MORE THAN USUAL: 0
6. FEELING BAD ABOUT YOURSELF - OR THAT YOU ARE A FAILURE OR HAVE LET YOURSELF OR YOUR FAMILY DOWN: 0
SUM OF ALL RESPONSES TO PHQ QUESTIONS 1-9: 1
SUM OF ALL RESPONSES TO PHQ9 QUESTIONS 1 & 2: 0
2. FEELING DOWN, DEPRESSED OR HOPELESS: 0
SUM OF ALL RESPONSES TO PHQ QUESTIONS 1-9: 1

## 2024-02-08 NOTE — PROGRESS NOTES
FOLLOW UP VISIT    Subjective:    Winifred Rossi (: 1960) is a 63 y.o., female,   Chief Complaint   Patient presents with    Forms     Form for work due to IBS    Sinus Problem     Sinus drainage for months       HPI:    Brother passed last month.    Having congestion, yellow drainage for a few months.  She was treated with 7 days of Augmentin 2023.    RA: seeing rheum, placed on plaquenil and prednisone    IBS: has had this for 30 years. Symptoms have been bad lately. Having diarrhea at work which makes her unable to work and she would like work accomidation, specifically accommodation to leave work when her symptoms start. She is currently working at a lending service. Takes stool softeners at night or else will get constipated. Saw GI in the past. Has had colonoscopy before, 6 years ago. Tried linzess in the past which didn't help. Has not tried bentyl before.    The following portions of the patient's history were reviewed and updated as appropriate:      Patient Active Problem List    Diagnosis Date Noted    GERD (gastroesophageal reflux disease) 2021    Anxiety 2019    External hemorrhoids without complication 10/14/2015    Plantar fascial fibromatosis 10/14/2015    Chronic rectal fissure 2015    Essential hypertension, benign 2012    Irritable bowel syndrome with both constipation and diarrhea 2024    Hyperlipidemia 2023    Pain associated with internal prosthetic device 2023    Obesity with body mass index 30 or greater 10/27/2021    Rheumatoid arthritis (HCC) 2019    Hammer toe of left foot 2017    Depressive disorder 10/14/2015      Past Medical History:   Diagnosis Date    Anxiety     Closed bimalleolar fracture of right ankle 1/15/2019    Closed fracture of first metatarsal bone of right foot 10/27/2021    Displaced fracture of first metatarsal bone, right foot, initial encounter for closed fracture    Closed fracture of head of radius

## 2024-02-22 ENCOUNTER — HOSPITAL ENCOUNTER (OUTPATIENT)
Dept: GENERAL RADIOLOGY | Age: 64
Discharge: HOME | End: 2024-02-22
Payer: COMMERCIAL

## 2024-02-22 DIAGNOSIS — K62.5 RECTAL BLEEDING: ICD-10-CM

## 2024-02-22 DIAGNOSIS — K58.2 IRRITABLE BOWEL SYNDROME WITH CONSTIPATION AND DIARRHEA: ICD-10-CM

## 2024-02-22 PROCEDURE — 74018 RADEX ABDOMEN 1 VIEW: CPT

## 2024-02-27 ENCOUNTER — PATIENT MESSAGE (OUTPATIENT)
Dept: INTERNAL MEDICINE CLINIC | Facility: CLINIC | Age: 64
End: 2024-02-27

## 2024-02-27 DIAGNOSIS — R09.81 SINUS CONGESTION: Primary | ICD-10-CM

## 2024-02-27 DIAGNOSIS — L29.9 EAR ITCHING: ICD-10-CM

## 2024-02-27 NOTE — TELEPHONE ENCOUNTER
From: Winifred Rossi  To: Dr. Rylee Guevara  Sent: 2/27/2024 11:52 AM EST  Subject: Yellow snot    I need a referral for a ear nose. And throat specialists  Still have yellow snot

## 2024-05-24 ENCOUNTER — TRANSCRIBE ORDERS (OUTPATIENT)
Dept: SCHEDULING | Age: 64
End: 2024-05-24

## 2024-05-24 DIAGNOSIS — R92.8 ABNORMAL MAMMOGRAM OF RIGHT BREAST: Primary | ICD-10-CM

## 2024-07-02 ENCOUNTER — HOSPITAL ENCOUNTER (OUTPATIENT)
Dept: MAMMOGRAPHY | Age: 64
Discharge: HOME | End: 2024-07-05
Payer: COMMERCIAL

## 2024-07-02 DIAGNOSIS — R92.8 ABNORMAL MAMMOGRAM OF RIGHT BREAST: ICD-10-CM

## 2024-07-02 PROCEDURE — G0279 TOMOSYNTHESIS, MAMMO: HCPCS

## 2024-07-02 PROCEDURE — 77066 DX MAMMO INCL CAD BI: CPT
